# Patient Record
Sex: MALE | Race: WHITE | URBAN - METROPOLITAN AREA
[De-identification: names, ages, dates, MRNs, and addresses within clinical notes are randomized per-mention and may not be internally consistent; named-entity substitution may affect disease eponyms.]

---

## 2018-03-09 ENCOUNTER — HISTORICAL (OUTPATIENT)
Dept: ADMINISTRATIVE | Facility: HOSPITAL | Age: 23
End: 2018-03-09

## 2019-10-03 LAB
INFLUENZA A ANTIGEN, POC: NEGATIVE
INFLUENZA B ANTIGEN, POC: NEGATIVE

## 2022-04-10 ENCOUNTER — HISTORICAL (OUTPATIENT)
Dept: ADMINISTRATIVE | Facility: HOSPITAL | Age: 27
End: 2022-04-10

## 2022-04-27 VITALS
SYSTOLIC BLOOD PRESSURE: 116 MMHG | BODY MASS INDEX: 33.18 KG/M2 | OXYGEN SATURATION: 95 % | WEIGHT: 237 LBS | DIASTOLIC BLOOD PRESSURE: 76 MMHG | HEIGHT: 71 IN

## 2022-05-04 NOTE — HISTORICAL [facility] CERNER
This is a historical note converted from [employer]. Formatting and pictures may have been removed.  Please reference [employer] for original formatting and attached multimedia. Chief Complaint  LT 3rd digit pain, smashed in door at work  History of Present Illness  Patient is a 22-year-old male?presenting with?a work injury to the left middle finger he jammed it in a door?and also sustained a minor?skin break?in the anterior portion?of the distal phalanges  Review of Systems  Constitutional_no fever, fatigue, weakness  Cardiovascular_no chest pain, tachycardia, bradycardia, arrhythmia  Respiratory_no shortness of breath, cough, wheezing, rhonchi  Musculoskeletal_left middle finger?jammed  Integumentary_small skin break?in the distal anterior?phalanges  Neurologic_no headache, no dizziness, no weakness or numbness  ?  Physical Exam  Vitals & Measurements  T:?36.9? ?C (Oral)? HR:?64(Peripheral)? BP:?143/98? SpO2:?98%?  HT:?180?cm? HT:?180?cm? WT:?106.5?kg? WT:?106.5?kg? BMI:?32.87?  VITAL SIGNS: ?Reviewed. ? ?  GENERAL:? In no apparent distress.?  CHEST:? Chest with clear breath sounds bilaterally.? No wheezes, rales, or rhonchi.?  CARDIAC:? Regular rate and rhythm.? S1 and S2, without murmurs, gallops, or rubs.  ABDOMEN:? Soft, without detectable tenderness.? No sign of distention.? No?? rebound or guarding, and no masses palpated.?? Bowel Sounds normal.  MUSCULOSKELETAL: Left?third phalangeal?swollen, mild bruising noted?and a small skin tear?at the distal joint?anterior aspect?no bleeding noted,?painful with movement or palpation.? Circulation sensation present no paresthesias present.  NEUROLOGIC EXAM:? Alert and oriented x 3.? No focal sensory or strength deficits.?? Speech normal.? Follows commands.  Assessment/Plan  1.?Pain of left middle finger  Ordered:  traMADol, 50 mg = 1 tab(s), Oral, BID, PRN PRN as needed for pain, X 5 day(s), # 10 tab(s), 0 Refill(s), Pharmacy: [id number]  XR Hand Third Digit  Left Min 2 Views, Routine, 03/09/18 9:16:00 CST, Trauma, None, Ambulatory, Rad Type, Pain of left middle finger, Not Scheduled, 03/09/18 9:16:00 CST  ?  2.?Closed fracture of finger  Ordered:  traMADol, 50 mg = 1 tab(s), Oral, BID, PRN PRN as needed for pain, X 5 day(s), # 10 tab(s), 0 Refill(s), Pharmacy: [id number]  ?  1. Instructed patient to follow-up with orthopedic of choice.  2. Instructed patient take tramadol as needed pain.  3. Instructed patient to wear finger splint?until seen by orthopedic surgeon.  4. Instructed patient if symptoms persist follow-up with ER or return to clinic.   Problem List/Past Medical History  Ongoing  ADD - Attention deficit disorder  ADHD - Attention deficit disorder with hyperactivity  Obesity  Historical  No qualifying data  Procedure/Surgical History  None.  Medications  Ultram 50 mg oral tablet, 50 mg= 1 tab(s), Oral, BID, PRN  VYVANSE 60 MG CAPSULE, 60 mg= 1 cap(s), Oral, qAM  Allergies  No Known Allergies  No Known Medication Allergies  Social History  Alcohol  Never, 03/09/2018  Substance Abuse  Never, 03/09/2018  Tobacco  Never smoker, 03/09/2018  Family History  Family history is negative

## 2022-09-21 ENCOUNTER — HISTORICAL (OUTPATIENT)
Dept: ADMINISTRATIVE | Facility: HOSPITAL | Age: 27
End: 2022-09-21